# Patient Record
Sex: MALE | Race: BLACK OR AFRICAN AMERICAN | Employment: FULL TIME | URBAN - METROPOLITAN AREA
[De-identification: names, ages, dates, MRNs, and addresses within clinical notes are randomized per-mention and may not be internally consistent; named-entity substitution may affect disease eponyms.]

---

## 2018-07-25 ENCOUNTER — TELEPHONE (OUTPATIENT)
Dept: ONCOLOGY | Age: 38
End: 2018-07-25

## 2018-07-25 NOTE — TELEPHONE ENCOUNTER
Called pt and left a voicemail requesting a return call with information on who referred pt to our office to obtain medical records.

## 2018-07-31 ENCOUNTER — OFFICE VISIT (OUTPATIENT)
Dept: ONCOLOGY | Age: 38
End: 2018-07-31

## 2018-07-31 VITALS
HEART RATE: 84 BPM | BODY MASS INDEX: 19.18 KG/M2 | OXYGEN SATURATION: 100 % | DIASTOLIC BLOOD PRESSURE: 68 MMHG | TEMPERATURE: 96.6 F | RESPIRATION RATE: 18 BRPM | HEIGHT: 71 IN | SYSTOLIC BLOOD PRESSURE: 104 MMHG | WEIGHT: 137 LBS

## 2018-07-31 DIAGNOSIS — K85.20 ALCOHOL-INDUCED ACUTE PANCREATITIS WITHOUT INFECTION OR NECROSIS: ICD-10-CM

## 2018-07-31 DIAGNOSIS — R91.8 LUNG NODULES: ICD-10-CM

## 2018-07-31 DIAGNOSIS — C73 THYROID CANCER (HCC): Primary | ICD-10-CM

## 2018-07-31 RX ORDER — LEVOTHYROXINE SODIUM 100 UG/1
TABLET ORAL
Refills: 1 | COMMUNITY
Start: 2018-05-22 | End: 2019-03-20 | Stop reason: DRUGHIGH

## 2018-07-31 NOTE — PROGRESS NOTES
[facility] [address] [facility] [address] [facility] [place]: [phone]  F: [phone]      Reason for Visit:   Ramírez Lin is a 43 y.o. male who is seen in consultation at the request of Dr. Yareli Casas for evaluation of thyroid cancer. Treatment History:   · CT A/P 4/17/2018: Multiple small round nodules in both lungs suspicious for metastatic disease. Extensive inflammatory changes surrounding pancreas and extending into pelvis consistent with pancreatitis. Hepatomegaly with fatty infiltration of liver. · CT Chest 4/18/2018: Diffuse pulmonary nodularity, worrisome for pulmonary metastases. Mediastinal lymphadenopathy. Asymmetrically enlarged multinodular thyroid goiter with significant enlargement of right thyroid lobe with mild tracheal deviation and narrowing. · Thyroid biopsy 4/18/2018: suspicious for papillary thyroid carcinoma  · Left lung biopsy 4/20/2018: Chronic bronchitis, increased pigmented alveolar macrophages, type II pneumocytes lining alveoli with mild acute and chronic interstitial inflammation  · Total thyroidectomy with central neck dissection and extensive right peritracheal lymph node dissection by Dr. Khanh Basurto 4/23/2018: Papillary carcinoma of the thyroid, usual type, involves both lobes and isthmus, widely multifocal.  Size 3.6cm. Positive anterior and posterior margin. 6/9 nodes involved, largest 1.6cm. · Stage I (pT2m pN1a M0, Age <54yo) Papillary Thyroid Cancer    History of Present Illness:   Ramírez Lin is a pleasant 43 y.o. male who presents today for evaluation of thyroid cancer. He presented to the [facility] ED earlier this year with abdominal pain and was diagnosed with acute pancreatitis. He reports heavy ETOH and dehydration leading up to this. Imaging of his abdomen incidentally noted pulmonary nodules, leading to imaging of his chest which incidentally noted an abnormal thyroid.   He had biopsies of his lung nodules (negative) and thyroid nodule (positive), and ultimately underwent a thyroidectomy while in the hospital.  He has been taking synthroid since discharge, but has had very intermittent compliance, no therapy in several weeks. He feels nauseated after taking these pills. 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 No dyspnea or cough. Pain from pancreatitis has resolved, though some foods still upset his stomach at times. He has cut back on ETOH considerably. He has not yet seen an endocrinologist.    He is unaccompanied today. Past Medical History:   Diagnosis Date    Alcohol-induced acute pancreatitis without infection or necrosis 7/31/2018    Thyroid cancer ([facility] [address] 7/31/2018      No past surgical history on file. Social History   Substance Use Topics    Smoking status: Not on file    Smokeless tobacco: Not on file    Alcohol use Not on file      No family history on file. Current Outpatient Prescriptions   Medication Sig    ibuprofen 100 mg tablet Take 100 mg by mouth every six (6) hours as needed for Pain.  levothyroxine (SYNTHROID) 100 mcg tablet TK 1 T PO D  ON EMPTY STOMACH. THIS REPLACES 0.25MG DOSE     No current facility-administered medications for this visit. No Known Allergies     Review of Systems: A complete review of systems was obtained, negative except as described above.     Physical Exam:     Visit Vitals    /68 (BP 1 Location: Left arm, BP Patient Position: Sitting)    Pulse 84    Temp 96.6 °F (35.9 °C) (Oral)    Resp 18    Ht 5' 10.5\" (1.791 m)    Wt 137 lb (62.1 kg)    SpO2 100%    BMI 19.38 kg/m2     General: No distress  Eyes: PERRLA, anicteric sclerae  HENT: Atraumatic, OP clear  Neck: Supple  Lymphatic: No cervical, supraclavicular, or inguinal adenopathy  Respiratory: CTAB, normal respiratory effort  CV: Normal rate, regular rhythm, no murmurs, no peripheral edema  GI: Soft, nontender, nondistended, no masses, no hepatomegaly, no splenomegaly  MS: Normal gait and station. Digits without clubbing or cyanosis. Skin: No rashes, ecchymoses, or petechiae. Normal temperature, turgor, and texture. Psych: Alert, oriented, appropriate affect, normal judgment/insight    Results:   4/22/2018  WBC: 5.62  GHB: 11.4  PLT: 162  Creatinine: 0.95  Lipase: 2051    6/29/2018  TSH: 3.61    CTA Chest 6/28/2018: Innumerable pulmonary nodules consistent with metastatic disease, similar to April study. Enlarged anterior mediastinal lymph node, similar to prior. Records reviewed and summarized above. Pathology report(s) reviewed above. Radiology report(s) reviewed above. Assessment:   1) Papillary Thyroid Cancer  He is s/p thyroidectomy. He has Stage I disease, based on age <54yo. However, pathology demonstrates positive margins. Additionally, his CT imaging is quite worrisome for pulmonary metastases, despite the negative biopsy, which would make this Stage II. I encouraged him to improve his compliance with synthroid, and explained the justification for the drug, including the importance of thyroid suppression. We also reviewed that thyroid cancer is generally managed by endocrinologists, rather than oncologists, and I recommend referral to meet with an endocrinologist ASAP. They can discuss with him whether BROWN may be indicated. I will remain on standby here to assist if as needed. Plan:     · Referral to endocrinology  · Return to see me as needed    I appreciate the opportunity to participate in Mr. Martha chavez.     Signed By: Ariana Alvarez MD

## 2018-09-14 ENCOUNTER — HOSPITAL ENCOUNTER (OUTPATIENT)
Dept: NUCLEAR MEDICINE | Age: 38
Discharge: HOME | End: 2018-09-14
Attending: INTERNAL MEDICINE
Payer: MEDICAID

## 2018-09-14 DIAGNOSIS — C73 THYROID CANCER (HCC): ICD-10-CM

## 2018-09-14 PROCEDURE — A9517 I131 IODIDE CAP, RX: HCPCS

## 2018-09-21 ENCOUNTER — HOSPITAL ENCOUNTER (OUTPATIENT)
Dept: NUCLEAR MEDICINE | Age: 38
Discharge: HOME | End: 2018-09-21
Attending: INTERNAL MEDICINE
Payer: MEDICAID

## 2018-09-21 DIAGNOSIS — C73 THYROID CANCER (HCC): ICD-10-CM

## 2018-09-21 PROCEDURE — 78018 THYROID MET IMAGING BODY: CPT

## 2019-03-20 ENCOUNTER — OFFICE VISIT (OUTPATIENT)
Dept: FAMILY MEDICINE CLINIC | Age: 39
End: 2019-03-20

## 2019-03-20 VITALS
BODY MASS INDEX: 18.2 KG/M2 | HEIGHT: 71 IN | HEART RATE: 78 BPM | RESPIRATION RATE: 16 BRPM | OXYGEN SATURATION: 99 % | WEIGHT: 130 LBS | TEMPERATURE: 97.8 F | DIASTOLIC BLOOD PRESSURE: 78 MMHG | SYSTOLIC BLOOD PRESSURE: 113 MMHG

## 2019-03-20 DIAGNOSIS — Z00.00 ANNUAL PHYSICAL EXAM: ICD-10-CM

## 2019-03-20 DIAGNOSIS — R91.8 LUNG NODULES: Primary | ICD-10-CM

## 2019-03-20 DIAGNOSIS — C73 THYROID CANCER (HCC): ICD-10-CM

## 2019-03-20 RX ORDER — CHOLECALCIFEROL (VITAMIN D3) 125 MCG
CAPSULE ORAL
COMMUNITY

## 2019-03-20 RX ORDER — LEVOTHYROXINE SODIUM 175 UG/1
175 TABLET ORAL
Qty: 30 TAB | Refills: 5 | Status: SHIPPED | OUTPATIENT
Start: 2019-03-20 | End: 2019-06-24

## 2019-03-20 NOTE — PROGRESS NOTES
Berta Chowdhury is a 43 y.o. male Presenting for his annual checkup and health maintenance review and follow-up Overall, doing well. Thyroid cancer concerns today. Last colonoscopy never. UTD on dental and vision. UTD on influenza. Patient is getting moderate exercise. Trying to eat a well balanced diet. Thyroid cancer ([facility] [address] MANAGED BY DR Lottie Contreras. HAS APPOINTMENT IN April PATIENT SAYS HIS SYNTHROID WAS INCREASED  MCG  MCG AT THE LAST VISIT WITH ENDO, BUT IT WAS GIVING HIM SOME SIDE EFFECTS, SO PT STOPPED TAKING SYNTHROID ALTOGTHER. NOT TAKING SYNTHROID SINCE END OF January. NOW FEELING FATIGUE , WEAKNESS. Health Maintenance Topic  DTaP/Tdap/Td series (1 - Tdap)  Influenza Age 5 to Adult  Pneumococcal 0-64 years Health Maintenance reviewed Vaccinations reviewed There is no immunization history on file for this patient. Past Medical History:  
Diagnosis Date  Acute pancreatitis  Alcohol-induced acute pancreatitis without infection or necrosis 7/31/2018  Lung nodule  Thyroid cancer ([facility] [address] 7/31/2018  
 
 has a past surgical history that includes hx other surgical. 
 
Patient has no known allergies. Current Outpatient Medications Medication Sig  
 naproxen sodium (ALEVE) 220 mg cap Take  by mouth.  levothyroxine (SYNTHROID) 175 mcg tablet Take 1 Tab by mouth Daily (before breakfast).  ibuprofen 100 mg tablet Take 100 mg by mouth every six (6) hours as needed for Pain. No current facility-administered medications for this visit. SOCIAL HX:  reports that he has been smoking. He has been smoking about 0.25 packs per day. He has never used smokeless tobacco. He reports that he drank alcohol. He reports that he does not use drugs. FAMILY HX: family history is not on file. Review of Systems Constitutional: Positive for malaise/fatigue. Night sweats HENT: Negative. Eyes: Negative. Respiratory: Negative. Cardiovascular: Negative. Gastrointestinal: Negative. Genitourinary: Negative. Musculoskeletal: Negative. Skin: Negative. Neurological: Negative. Endo/Heme/Allergies: Negative. Psychiatric/Behavioral: Negative. Physical exam 
Blood pressure 113/78, pulse 78, temperature 97.8 °F (36.6 °C), temperature source Oral, resp. rate 16, height 5' 10.5\" (1.791 m), weight 130 lb (59 kg), SpO2 99 %. Wt Readings from Last 3 Encounters:  
03/20/19 130 lb (59 kg) 07/31/18 137 lb (62.1 kg) Gen:  Well developed, well nourished male in no acute distress HEENT: normocephalic/atraumatic; PERRL; TM intact, translucent, and neutral BL;  oropharynx shows no erythema or exudates Skin:  No rashes or suspicious skin lesions noted Neck:   Supple, no lympadenopathy, no thyromegaly Card:  RRR, no m/r/g Chest:  CTAB, no w/r/r Abd:  BS+, Soft, nontender/nondistended Extr:  2+ pulses BL, no LE edema MS:   full ROM, 5/5 strength BL, sensation intact Neuro: AAO X 3, CN II-XII grossly intact Psych:  Nl mood and affect 1. Lung nodules MULTIPLE LUNG NODULES NOTED ON CT SCAN, FIRST LUNG BIOPSY NEGATIVE. SECOND LUNG BIOPSY SUGGESTED BY ENDOCRINOLOGIST. NEEDS TO FOLLOW UP WITH ENDO FOR THAT . - CBC WITH AUTOMATED DIFF 
- METABOLIC PANEL, COMPREHENSIVE 
- VITAMIN D, 25 HYDROXY 2. Thyroid cancer ([facility] [address] MANAGED BY DR Lottie Riley. HAS APPOINTMENT IN April PATIENT SAYS HIS SYNTHROID WAS INCREASED FROM 125 MCG  MCG AT THE LAST VISIT WITH ENDO, BUT IT WAS GIVING HIM SOME SIDE EFFECTS, SO PT STOPPED TAKING SYNTHROID ALTOGETHER. NOT TAKING SYNTHROID SINCE END OF January. ADVISED PATIENT TO RESTART SYNTHROID  MCG. FAXED A NEW RX TO PHARMACY. ADVISED PT TO FOLLOW UP WITH ENDO. - CBC WITH AUTOMATED DIFF 
- METABOLIC PANEL, COMPREHENSIVE 
- TSH 3RD GENERATION 
- VITAMIN D, 25 HYDROXY 
- levothyroxine (SYNTHROID) 175 mcg tablet;  Take 1 Tab by mouth Daily (before breakfast). Dispense: 30 Tab; Refill: 5 
 
3. Annual physical exam 
 
- CBC WITH AUTOMATED DIFF 
- LIPID PANEL 
- METABOLIC PANEL, COMPREHENSIVE 
- URINALYSIS W/ RFLX MICROSCOPIC 
- VITAMIN D, 25 HYDROXY 
 
 
 
 
 
43 y.o. male who presents today for annual exam. UTD on screening. utd on vaccines. Will check routine labs. Encouraged daily exercise and trying to eat a well balanced diet. I have discussed the diagnosis with the patient and the intended plan as seen in the above orders. The patient has received an after-visit summary and questions were answered concerning future plans. I have discussed medication side effects and warnings with the patient as well. The patient agrees and understands above plan. Follow-up Disposition: Not on File Simin Mcneill MD

## 2019-03-20 NOTE — PROGRESS NOTES
Identified pt with two pt identifiers(name and ). Chief Complaint Patient presents with  Complete Physical  
  
 
Health Maintenance Due Topic  DTaP/Tdap/Td series (1 - Tdap)  Influenza Age 5 to Adult Wt Readings from Last 3 Encounters:  
19 130 lb (59 kg) 18 137 lb (62.1 kg) Temp Readings from Last 3 Encounters:  
18 96.6 °F (35.9 °C) (Oral) BP Readings from Last 3 Encounters:  
18 104/68 Pulse Readings from Last 3 Encounters:  
18 84 Learning Assessment: 
:  
 
No flowsheet data found. Depression Screening: 
:  
 
3 most recent PHQ Screens 3/20/2019 Little interest or pleasure in doing things Not at all Feeling down, depressed, irritable, or hopeless Not at all Total Score PHQ 2 0 Fall Risk Assessment: 
:  
 
No flowsheet data found. Abuse Screening: 
:  
 
No flowsheet data found. Coordination of Care Questionnaire: 
:  
 
1) Have you been to an emergency room, urgent care clinic since your last visit? no  
Hospitalized since your last visit? no          
 
2) Have you seen or consulted any other health care providers outside of [address] [place] [address] since your last visit? Dr. Tino Brunner   (Include any pap smears or colon screenings in this section.) 3) Do you have an Advance Directive on file? no 
Are you interested in receiving information about Advance Directives? no 
 
Patient is accompanied by self I have received verbal consent from Berta Chowdhury to discuss any/all medical information while they are present in the room. Reviewed record in preparation for visit and have obtained necessary documentation. Medication reconciliation up to date and corrected with patient at this time.

## 2019-03-21 LAB
25(OH)D3+25(OH)D2 SERPL-MCNC: 15.8 NG/ML (ref 30–100)
ALBUMIN SERPL-MCNC: 4.1 G/DL (ref 3.5–5.5)
ALBUMIN/GLOB SERPL: 1.4 {RATIO} (ref 1.2–2.2)
ALP SERPL-CCNC: 74 IU/L (ref 39–117)
ALT SERPL-CCNC: 13 IU/L (ref 0–44)
APPEARANCE UR: CLEAR
AST SERPL-CCNC: 28 IU/L (ref 0–40)
BASOPHILS # BLD AUTO: 0.1 X10E3/UL (ref 0–0.2)
BASOPHILS NFR BLD AUTO: 1 %
BILIRUB SERPL-MCNC: 1.2 MG/DL (ref 0–1.2)
BILIRUB UR QL STRIP: NEGATIVE
BUN SERPL-MCNC: 11 MG/DL (ref 6–20)
BUN/CREAT SERPL: 11 (ref 9–20)
CALCIUM SERPL-MCNC: 8.7 MG/DL (ref 8.7–10.2)
CHLORIDE SERPL-SCNC: 101 MMOL/L (ref 96–106)
CHOLEST SERPL-MCNC: 102 MG/DL (ref 100–199)
CO2 SERPL-SCNC: 25 MMOL/L (ref 20–29)
COLOR UR: YELLOW
CREAT SERPL-MCNC: 1.02 MG/DL (ref 0.76–1.27)
EOSINOPHIL # BLD AUTO: 0.3 X10E3/UL (ref 0–0.4)
EOSINOPHIL NFR BLD AUTO: 5 %
ERYTHROCYTE [DISTWIDTH] IN BLOOD BY AUTOMATED COUNT: 13.9 % (ref 12.3–15.4)
GLOBULIN SER CALC-MCNC: 2.9 G/DL (ref 1.5–4.5)
GLUCOSE SERPL-MCNC: 82 MG/DL (ref 65–99)
GLUCOSE UR QL: NEGATIVE
HCT VFR BLD AUTO: 43.1 % (ref 37.5–51)
HDLC SERPL-MCNC: 58 MG/DL
HGB BLD-MCNC: 14.6 G/DL (ref 13–17.7)
HGB UR QL STRIP: NEGATIVE
IMM GRANULOCYTES # BLD AUTO: 0 X10E3/UL (ref 0–0.1)
IMM GRANULOCYTES NFR BLD AUTO: 0 %
KETONES UR QL STRIP: NEGATIVE
LDLC SERPL CALC-MCNC: 28 MG/DL (ref 0–99)
LEUKOCYTE ESTERASE UR QL STRIP: NEGATIVE
LYMPHOCYTES # BLD AUTO: 1.3 X10E3/UL (ref 0.7–3.1)
LYMPHOCYTES NFR BLD AUTO: 24 %
MCH RBC QN AUTO: 33 PG (ref 26.6–33)
MCHC RBC AUTO-ENTMCNC: 33.9 G/DL (ref 31.5–35.7)
MCV RBC AUTO: 98 FL (ref 79–97)
MICRO URNS: NORMAL
MONOCYTES # BLD AUTO: 0.3 X10E3/UL (ref 0.1–0.9)
MONOCYTES NFR BLD AUTO: 5 %
NEUTROPHILS # BLD AUTO: 3.4 X10E3/UL (ref 1.4–7)
NEUTROPHILS NFR BLD AUTO: 65 %
NITRITE UR QL STRIP: NEGATIVE
PH UR STRIP: 5 [PH] (ref 5–7.5)
PLATELET # BLD AUTO: 136 X10E3/UL (ref 150–379)
POTASSIUM SERPL-SCNC: 4 MMOL/L (ref 3.5–5.2)
PROT SERPL-MCNC: 7 G/DL (ref 6–8.5)
PROT UR QL STRIP: NEGATIVE
RBC # BLD AUTO: 4.42 X10E6/UL (ref 4.14–5.8)
SODIUM SERPL-SCNC: 141 MMOL/L (ref 134–144)
SP GR UR: 1.02 (ref 1–1.03)
TRIGL SERPL-MCNC: 81 MG/DL (ref 0–149)
TSH SERPL DL<=0.005 MIU/L-ACNC: 18.73 UIU/ML (ref 0.45–4.5)
UROBILINOGEN UR STRIP-MCNC: 0.2 MG/DL (ref 0.2–1)
VLDLC SERPL CALC-MCNC: 16 MG/DL (ref 5–40)
WBC # BLD AUTO: 5.3 X10E3/UL (ref 3.4–10.8)

## 2019-03-22 NOTE — PROGRESS NOTES
PLEASE CALL PATIENT & INFORM HIM THAT HIS TSH IS TOO HIGH, IT IS VERY IMPORTANT THAT HE STARTS HIS THYROID MEDICATION, & TAKES IT AS PRESCRIBED. HE NEEDS TO FOLLOWUP WITH HIS ENDOCRINOLOGIST SOON. THANKS.

## 2019-03-25 NOTE — PROGRESS NOTES
Left message for patient, advised TSH is high, needs to take thyroid medication as prescribed and needs to follow up with his endocrinologist as soon as possible. Advised him to call back if he would like lab letter mailed.

## 2019-05-29 ENCOUNTER — OFFICE VISIT (OUTPATIENT)
Dept: FAMILY MEDICINE CLINIC | Age: 39
End: 2019-05-29

## 2019-05-29 VITALS
TEMPERATURE: 98.1 F | OXYGEN SATURATION: 95 % | HEIGHT: 71 IN | WEIGHT: 122 LBS | RESPIRATION RATE: 16 BRPM | DIASTOLIC BLOOD PRESSURE: 68 MMHG | HEART RATE: 104 BPM | BODY MASS INDEX: 17.08 KG/M2 | SYSTOLIC BLOOD PRESSURE: 95 MMHG

## 2019-05-29 DIAGNOSIS — R10.31 RIGHT LOWER QUADRANT ABDOMINAL PAIN: Primary | ICD-10-CM

## 2019-05-29 RX ORDER — LEVOTHYROXINE SODIUM 200 UG/1
200 TABLET ORAL DAILY
COMMUNITY
Start: 2019-05-23

## 2019-05-29 NOTE — PROGRESS NOTES
Identified pt with two pt identifiers(name and ). Reviewed record in preparation for visit and have obtained necessary documentation. Chief Complaint   Patient presents with    Constipation     last bowel movement 2 days ago, thin hard stools; has been passing gas    Epigastric Pain     some vomiting with burning        Health Maintenance Due   Topic    Pneumococcal 0-64 years (1 of 1 - PPSV23)    DTaP/Tdap/Td series (1 - Tdap)       Coordination of Care Questionnaire:  :   1) Have you been to an emergency room, urgent care, or hospitalized since your last visit? If yes, where when, and reason for visit? no       2. Have seen or consulted any other health care provider since your last visit? If yes, where when, and reason for visit? YES  - Dr. Nel Petty ([facility])      Patient is accompanied by girlfriend I have received verbal consent from Deisi Campuzano to discuss any/all medical information while they are present in the room.

## 2019-05-29 NOTE — PROGRESS NOTES
Krystin Purvis is a 35 y.o. male who presents today with the following:    HPI  Chief Complaint   Patient presents with    Constipation     last bowel movement 2 days ago, thin hard stools; has been passing gas    Epigastric Pain     some vomiting with burning    Incisional Pain     internal stiches from abdominal surgery (in 2007) have started to protrude, pt reports foul smell when he pulled one out   Abdominal pain: Patient is here with abdominal pain. He says his symptoms started 2 weeks ago. Complaining of constipation which is intermittent, and passes hard stools. Also complaining of epigastric pain. He vomited once today at 4 AM.  He does not have any fevers or chills but his pulse is elevated and is in discomfort. Patient reports abdominal pain to be a level 8/10 at this time. Associated symptoms include chest tightness, heartburn, bubbling and bloating feeling in the abdomen. Patient has tried Tums which did not help him. Patient is supposed to start radiation therapy this week by his endocrinologist for thyroid cancer. Review of Systems   Constitutional: Negative. HENT: Negative. Eyes: Negative. Respiratory: Negative. Cardiovascular: Negative. Gastrointestinal: Positive for abdominal pain, constipation, heartburn and vomiting. Genitourinary: Negative. Musculoskeletal: Negative. Skin: Negative. Neurological: Negative. Endo/Heme/Allergies: Negative. Psychiatric/Behavioral: Negative. Physical Exam   Constitutional: He is oriented to person, place, and time and well-developed, well-nourished, and in no distress. HENT:   Head: Normocephalic and atraumatic. Right Ear: External ear normal.   Left Ear: External ear normal.   Nose: Nose normal.   Mouth/Throat: No oropharyngeal exudate. Eyes: Conjunctivae are normal.   Neck: Normal range of motion. Neck supple. No thyromegaly present. Cardiovascular: Regular rhythm. Tachycardia present.    Pulmonary/Chest: Effort normal and breath sounds normal.   Abdominal: Bowel sounds are normal. He exhibits no distension. There is tenderness in the right upper quadrant and right lower quadrant. There is guarding. Musculoskeletal: Normal range of motion. He exhibits no edema. Lymphadenopathy:     He has no cervical adenopathy. Neurological: He is alert and oriented to person, place, and time. Skin: Skin is warm and dry. Psychiatric: Mood and affect normal.   Nursing note and vitals reviewed. BP 95/68   Pulse (!) 104   Temp 98.1 °F (36.7 °C) (Oral)   Resp 16   Ht 5' 10.5\" (1.791 m)   Wt 122 lb (55.3 kg)   SpO2 95%   BMI 17.26 kg/m²     No Known Allergies    Current Outpatient Medications   Medication Sig    levothyroxine (SYNTHROID) 200 mcg tablet Take 200 mcg by mouth daily.  naproxen sodium (ALEVE) 220 mg cap Take  by mouth.  ibuprofen 100 mg tablet Take 100 mg by mouth every six (6) hours as needed for Pain.  levothyroxine (SYNTHROID) 175 mcg tablet Take 1 Tab by mouth Daily (before breakfast). (Patient not taking: Reported on 5/29/2019)     No current facility-administered medications for this visit. Past Medical History:   Diagnosis Date    Acute pancreatitis     Alcohol-induced acute pancreatitis without infection or necrosis 7/31/2018    Lung nodule     Thyroid cancer ([facility] [address] 7/31/2018       Past Surgical History:   Procedure Laterality Date    HX OTHER SURGICAL      left chest trauma - 2004       No results found for this visit on 05/29/19.      1. Right lower quadrant abdominal pain  Patient having right lower quadrant and right upper quadrant and epigastric tenderness. With elevated heart rate and tenderness on palpation I would send patient to ER to rule out appendicitis, pancreatitis or any other intra-abdominal pathology.  - REFERRAL TO EMERGENCY DEPARTMENT        Follow-up and Dispositions    · Return if symptoms worsen or fail to improve.          I have discussed the diagnosis with the patient and the intended plan as seen in the above orders. The patient has received an after-visit summary and questions were answered concerning future plans. I have discussed medication side effects and warnings with the patient as well. The patient agrees and understands above plan.            Betzy Loomis MD

## 2019-06-24 ENCOUNTER — OFFICE VISIT (OUTPATIENT)
Dept: FAMILY MEDICINE CLINIC | Age: 39
End: 2019-06-24

## 2019-06-24 VITALS
SYSTOLIC BLOOD PRESSURE: 109 MMHG | RESPIRATION RATE: 16 BRPM | WEIGHT: 124 LBS | HEIGHT: 71 IN | BODY MASS INDEX: 17.36 KG/M2 | TEMPERATURE: 98.4 F | HEART RATE: 92 BPM | OXYGEN SATURATION: 98 % | DIASTOLIC BLOOD PRESSURE: 74 MMHG

## 2019-06-24 DIAGNOSIS — K56.51 INTESTINAL ADHESIONS WITH PARTIAL OBSTRUCTION (HCC): Primary | ICD-10-CM

## 2019-06-24 RX ORDER — ONDANSETRON 4 MG/1
TABLET, FILM COATED ORAL
COMMUNITY
Start: 2019-06-18

## 2019-06-24 NOTE — PROGRESS NOTES
Chetna Pulido is a 35 y.o. male      Chief Complaint   Patient presents with    Follow-up     ER visit          1. Have you been to the ER, urgent care clinic since your last visit? Hospitalized since your last visit? Yes ER abscess small intestine      2. Have you seen or consulted any other health care providers outside of the [address] [place] [address] since your last visit? Include any pap smears or colon screening.   no

## 2019-06-24 NOTE — PROGRESS NOTES
Deisi Campuzano is a 35 y.o. male who presents today with the following:    HPI  Chief Complaint   Patient presents with    Follow-up     ER visit      Patient was seen in the office on 5/29/2019 for abdominal pain and was sent to the ER. Patient went to Dr. Staci Freeman right away and was diagnosed with abscess and small bowel duodenum. The abscess was drained and he also had scar tissue causing obstruction of the small bowel. He got surgery done by Dr. Baron Pena and now feels better. He was discharged on Justina 10. His abdominal pain has improved and he does not take pain medication anymore. He has follow-up appointment with surgeon tomorrow. Review of Systems   Constitutional: Negative. HENT: Negative. Eyes: Negative. Respiratory: Negative. Cardiovascular: Negative. Gastrointestinal: Positive for abdominal pain. Genitourinary: Negative. Musculoskeletal: Negative. Skin: Negative. Neurological: Negative. Endo/Heme/Allergies: Negative. Psychiatric/Behavioral: Negative. Physical Exam   Constitutional: He is oriented to person, place, and time and well-developed, well-nourished, and in no distress. HENT:   Head: Normocephalic and atraumatic. Right Ear: External ear normal.   Left Ear: External ear normal.   Nose: Nose normal.   Mouth/Throat: No oropharyngeal exudate. Eyes: Conjunctivae are normal.   Neck: Normal range of motion. Neck supple. No thyromegaly present. Cardiovascular: Normal rate and regular rhythm. Pulmonary/Chest: Effort normal and breath sounds normal.   Abdominal: Soft. Bowel sounds are normal. He exhibits no distension. There is no tenderness. Musculoskeletal: Normal range of motion. He exhibits no edema. Lymphadenopathy:     He has no cervical adenopathy. Neurological: He is alert and oriented to person, place, and time. Skin: Skin is warm and dry. Psychiatric: Mood and affect normal.   Nursing note and vitals reviewed.     /74 (BP 1 Location: Right arm, BP Patient Position: Sitting)   Pulse 92   Temp 98.4 °F (36.9 °C) (Oral)   Resp 16   Ht 5' 10.5\" (1.791 m)   Wt 124 lb (56.2 kg)   SpO2 98%   BMI 17.54 kg/m²     No Known Allergies    Current Outpatient Medications   Medication Sig    ondansetron hcl (ZOFRAN) 4 mg tablet     levothyroxine (SYNTHROID) 200 mcg tablet Take 200 mcg by mouth daily.  naproxen sodium (ALEVE) 220 mg cap Take  by mouth.  ibuprofen 100 mg tablet Take 100 mg by mouth every six (6) hours as needed for Pain. No current facility-administered medications for this visit. Past Medical History:   Diagnosis Date    Acute pancreatitis     Alcohol-induced acute pancreatitis without infection or necrosis 7/31/2018    Lung nodule     Thyroid cancer ([facility] [address] 7/31/2018       Past Surgical History:   Procedure Laterality Date    HX OTHER SURGICAL      left chest trauma - 2004       No results found for this visit on 06/24/19.      1. Intestinal adhesions with partial obstruction [facility])  We will get records from [facility].  Patient has follow-up appointment with Dr. Derek Jones tomorrow. Patient feels better and does not need any pain medication today. Patient is being followed by endocrinologist for his thyroid cancer. He will follow-up with Endo after July 4 to set up radiation therapy. His thyroid medication was recently increased after blood work done on 5/20/2019. Follow-up and Dispositions    · Return in about 3 months (around 9/24/2019) for follow up. I have discussed the diagnosis with the patient and the intended plan as seen in the above orders. The patient has received an after-visit summary and questions were answered concerning future plans. I have discussed medication side effects and warnings with the patient as well. The patient agrees and understands above plan.            Ling Millard MD

## 2019-09-06 ENCOUNTER — HOSPITAL ENCOUNTER (OUTPATIENT)
Dept: NUCLEAR MEDICINE | Age: 39
Discharge: HOME | End: 2019-09-06
Attending: INTERNAL MEDICINE
Payer: MEDICAID

## 2019-09-06 DIAGNOSIS — C73 THYROID CANCER (HCC): ICD-10-CM

## 2019-09-06 PROCEDURE — 79005 NUCLEAR RX ORAL ADMIN: CPT

## 2019-09-13 ENCOUNTER — HOSPITAL ENCOUNTER (OUTPATIENT)
Dept: NUCLEAR MEDICINE | Age: 39
Discharge: HOME | End: 2019-09-13
Attending: INTERNAL MEDICINE
Payer: MEDICAID

## 2019-09-13 DIAGNOSIS — C73 THYROID CANCER (HCC): ICD-10-CM

## 2019-09-13 PROCEDURE — 78018 THYROID MET IMAGING BODY: CPT

## 2021-02-06 NOTE — LETTER
NOTIFICATION RETURN TO WORK / SCHOOL 
 
6/24/2019 12:26 PM 
 
Mr. Tabatha Chandler [address] [facility] [id number] To Whom It May Concern: 
 
Tabatha Snow is currently under the care of [address] P[facility]rez. He started having medical problems on 5/21/19. He will return to work/school on: 7/15/19. If there are questions or concerns please have the patient contact our office. Sincerely, Ethan Brewer MD 
 
                                
 

No

## 2022-03-19 PROBLEM — R91.8 LUNG NODULES: Status: ACTIVE | Noted: 2018-07-31

## 2022-03-19 PROBLEM — C73 THYROID CANCER (HCC): Status: ACTIVE | Noted: 2018-07-31

## 2022-03-20 PROBLEM — K85.20 ALCOHOL-INDUCED ACUTE PANCREATITIS WITHOUT INFECTION OR NECROSIS: Status: ACTIVE | Noted: 2018-07-31

## 2023-05-25 RX ORDER — LEVOTHYROXINE SODIUM 0.2 MG/1
200 TABLET ORAL DAILY
COMMUNITY
Start: 2019-05-23

## 2023-05-25 RX ORDER — ONDANSETRON 4 MG/1
TABLET, FILM COATED ORAL
COMMUNITY
Start: 2019-06-18

## 2023-05-25 RX ORDER — COVID-19 ANTIGEN TEST
KIT MISCELLANEOUS
COMMUNITY

## 2024-08-28 ENCOUNTER — OFFICE VISIT (OUTPATIENT)
Age: 44
End: 2024-08-28
Payer: COMMERCIAL

## 2024-08-28 VITALS
BODY MASS INDEX: 13.97 KG/M2 | WEIGHT: 99.8 LBS | DIASTOLIC BLOOD PRESSURE: 78 MMHG | OXYGEN SATURATION: 95 % | SYSTOLIC BLOOD PRESSURE: 107 MMHG | RESPIRATION RATE: 14 BRPM | HEART RATE: 96 BPM | HEIGHT: 71 IN

## 2024-08-28 DIAGNOSIS — R63.4 UNINTENTIONAL WEIGHT LOSS: ICD-10-CM

## 2024-08-28 DIAGNOSIS — M25.811 IMPINGEMENT OF RIGHT SHOULDER: ICD-10-CM

## 2024-08-28 DIAGNOSIS — M54.50 ACUTE BILATERAL LOW BACK PAIN WITHOUT SCIATICA: Primary | ICD-10-CM

## 2024-08-28 PROBLEM — R79.83 HYPERHOMOCYSTINEMIA: Status: ACTIVE | Noted: 2024-08-28

## 2024-08-28 PROBLEM — Z87.19 HISTORY OF PANCREATITIS: Status: ACTIVE | Noted: 2018-07-31

## 2024-08-28 PROBLEM — K52.9 CHRONIC DIARRHEA: Status: ACTIVE | Noted: 2024-08-28

## 2024-08-28 PROBLEM — E03.9 ACQUIRED HYPOTHYROIDISM: Status: ACTIVE | Noted: 2024-08-28

## 2024-08-28 PROBLEM — Z85.850 HISTORY OF THYROID CANCER: Status: ACTIVE | Noted: 2018-07-31

## 2024-08-28 PROBLEM — D50.9 IRON DEFICIENCY ANEMIA: Status: ACTIVE | Noted: 2024-08-28

## 2024-08-28 PROCEDURE — 99203 OFFICE O/P NEW LOW 30 MIN: CPT | Performed by: INTERNAL MEDICINE

## 2024-08-28 RX ORDER — ERGOCALCIFEROL 1.25 MG/1
1.25 CAPSULE ORAL WEEKLY
COMMUNITY
Start: 2024-05-17 | End: 2025-05-17

## 2024-08-28 RX ORDER — FERROUS SULFATE 324(65)MG
324 TABLET, DELAYED RELEASE (ENTERIC COATED) ORAL
COMMUNITY
Start: 2023-01-15

## 2024-08-28 RX ORDER — FAMOTIDINE 20 MG/1
20 TABLET, FILM COATED ORAL
COMMUNITY
Start: 2023-05-02

## 2024-08-28 RX ORDER — LEVOTHYROXINE SODIUM 137 UG/1
137 TABLET ORAL DAILY
COMMUNITY

## 2024-08-28 RX ORDER — CYCLOBENZAPRINE HCL 10 MG
10 TABLET ORAL 3 TIMES DAILY PRN
Qty: 30 TABLET | Refills: 1 | Status: SHIPPED | OUTPATIENT
Start: 2024-08-28

## 2024-08-28 RX ORDER — CYCLOBENZAPRINE HCL 10 MG
10 TABLET ORAL 3 TIMES DAILY PRN
COMMUNITY
Start: 2024-07-18 | End: 2024-08-28 | Stop reason: SDUPTHER

## 2024-08-28 ASSESSMENT — PATIENT HEALTH QUESTIONNAIRE - PHQ9
SUM OF ALL RESPONSES TO PHQ QUESTIONS 1-9: 0
2. FEELING DOWN, DEPRESSED OR HOPELESS: NOT AT ALL
SUM OF ALL RESPONSES TO PHQ9 QUESTIONS 1 & 2: 0
SUM OF ALL RESPONSES TO PHQ QUESTIONS 1-9: 0
1. LITTLE INTEREST OR PLEASURE IN DOING THINGS: NOT AT ALL
SUM OF ALL RESPONSES TO PHQ QUESTIONS 1-9: 0
SUM OF ALL RESPONSES TO PHQ QUESTIONS 1-9: 0
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
## 2024-09-18 RX ORDER — LEVOTHYROXINE SODIUM 137 UG/1
137 TABLET ORAL DAILY
Qty: 30 TABLET | Refills: 1 | OUTPATIENT
Start: 2024-09-18 | End: 2024-11-17

## 2024-09-24 DIAGNOSIS — M54.50 ACUTE BILATERAL LOW BACK PAIN WITHOUT SCIATICA: ICD-10-CM

## 2024-09-25 RX ORDER — CYCLOBENZAPRINE HCL 10 MG
10 TABLET ORAL 3 TIMES DAILY PRN
Qty: 30 TABLET | Refills: 0 | Status: SHIPPED | OUTPATIENT
Start: 2024-09-25

## 2024-11-18 DIAGNOSIS — M54.50 ACUTE BILATERAL LOW BACK PAIN WITHOUT SCIATICA: ICD-10-CM

## 2024-11-18 RX ORDER — CYCLOBENZAPRINE HCL 10 MG
10 TABLET ORAL 3 TIMES DAILY PRN
Qty: 30 TABLET | Refills: 0 | OUTPATIENT
Start: 2024-11-18

## 2024-11-18 NOTE — TELEPHONE ENCOUNTER
PCP: Anjali Rebolledo MD    Last appt:  8/28/24  No future appointments.    Requested Prescriptions     Pending Prescriptions Disp Refills    cyclobenzaprine (FLEXERIL) 10 MG tablet 30 tablet 0     Sig: Take 1 tablet by mouth 3 times daily as needed for Muscle spasms     Last ordered 9/25/24    Patient requesting refill and states that back pain is no better and possibly worse.     Carisa \"Balaji\" JUJU Nielsen

## 2024-12-11 ENCOUNTER — OFFICE VISIT (OUTPATIENT)
Facility: CLINIC | Age: 44
End: 2024-12-11
Payer: COMMERCIAL

## 2024-12-11 VITALS
HEART RATE: 80 BPM | SYSTOLIC BLOOD PRESSURE: 105 MMHG | BODY MASS INDEX: 13.22 KG/M2 | RESPIRATION RATE: 14 BRPM | OXYGEN SATURATION: 97 % | HEIGHT: 71 IN | WEIGHT: 94.4 LBS | DIASTOLIC BLOOD PRESSURE: 76 MMHG

## 2024-12-11 DIAGNOSIS — M25.811 IMPINGEMENT OF RIGHT SHOULDER: Primary | ICD-10-CM

## 2024-12-11 DIAGNOSIS — M54.9 UPPER BACK PAIN ON RIGHT SIDE: ICD-10-CM

## 2024-12-11 DIAGNOSIS — R63.4 UNINTENTIONAL WEIGHT LOSS: ICD-10-CM

## 2024-12-11 PROCEDURE — 99214 OFFICE O/P EST MOD 30 MIN: CPT | Performed by: INTERNAL MEDICINE

## 2024-12-11 RX ORDER — CYCLOBENZAPRINE HCL 10 MG
10 TABLET ORAL 3 TIMES DAILY PRN
Qty: 15 TABLET | Refills: 2 | Status: SHIPPED | OUTPATIENT
Start: 2024-12-11

## 2024-12-11 ASSESSMENT — PATIENT HEALTH QUESTIONNAIRE - PHQ9
SUM OF ALL RESPONSES TO PHQ QUESTIONS 1-9: 2
SUM OF ALL RESPONSES TO PHQ QUESTIONS 1-9: 2
1. LITTLE INTEREST OR PLEASURE IN DOING THINGS: SEVERAL DAYS
2. FEELING DOWN, DEPRESSED OR HOPELESS: SEVERAL DAYS
SUM OF ALL RESPONSES TO PHQ9 QUESTIONS 1 & 2: 2
SUM OF ALL RESPONSES TO PHQ QUESTIONS 1-9: 2
SUM OF ALL RESPONSES TO PHQ QUESTIONS 1-9: 2

## 2024-12-11 NOTE — PATIENT INSTRUCTIONS
GI--call for an appointment.     You should ask Dr. Montiel's office for an appointment at the [facility]
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
## 2025-01-07 RX ORDER — CYCLOBENZAPRINE HCL 10 MG
10 TABLET ORAL 3 TIMES DAILY PRN
Qty: 15 TABLET | Refills: 2 | Status: SHIPPED | OUTPATIENT
Start: 2025-01-07

## 2025-01-07 NOTE — TELEPHONE ENCOUNTER
PCP: Anjali Rebolledo MD    Last Appointment: 12/11/2024    Future Appointments   Date Time Provider Department Center   1/8/2025  1:00 PM Neha Barrios MD [facility]   1/13/2025  4:00 PM Ariel Mari, PT [facility]   2/20/2025  2:00 PM Fidencio Garcia MD [facility]       Requested Prescriptions     Pending Prescriptions Disp Refills    cyclobenzaprine (FLEXERIL) 10 MG tablet 15 tablet 2     Sig: Take 1 tablet by mouth 3 times daily as needed for Muscle spasms       LAST ORDERED: 12/11/24   Pt has appt tomorrow for Ortho Surgery/NP Shoulder Xray needed  Appt 1/13/25-[facility] PT Eval  Appt 2/20/25-NP Spine PT-Lower back pain    Carisa \"Sledge\" JUJU Nielsen

## 2025-01-08 ENCOUNTER — OFFICE VISIT (OUTPATIENT)
Age: 45
End: 2025-01-08
Payer: MEDICAID

## 2025-01-08 VITALS
WEIGHT: 99.65 LBS | SYSTOLIC BLOOD PRESSURE: 102 MMHG | HEIGHT: 71 IN | HEART RATE: 83 BPM | OXYGEN SATURATION: 99 % | DIASTOLIC BLOOD PRESSURE: 71 MMHG | BODY MASS INDEX: 13.95 KG/M2 | TEMPERATURE: 97.9 F | RESPIRATION RATE: 15 BRPM

## 2025-01-08 DIAGNOSIS — M75.21 BICEPS TENDINITIS OF RIGHT UPPER EXTREMITY: ICD-10-CM

## 2025-01-08 DIAGNOSIS — M75.81 ROTATOR CUFF TENDINITIS, RIGHT: ICD-10-CM

## 2025-01-08 DIAGNOSIS — M25.511 RIGHT SHOULDER PAIN, UNSPECIFIED CHRONICITY: Primary | ICD-10-CM

## 2025-01-08 PROCEDURE — 99203 OFFICE O/P NEW LOW 30 MIN: CPT | Performed by: ORTHOPAEDIC SURGERY

## 2025-01-08 ASSESSMENT — PATIENT HEALTH QUESTIONNAIRE - PHQ9
SUM OF ALL RESPONSES TO PHQ9 QUESTIONS 1 & 2: 0
SUM OF ALL RESPONSES TO PHQ QUESTIONS 1-9: 0
1. LITTLE INTEREST OR PLEASURE IN DOING THINGS: NOT AT ALL
SUM OF ALL RESPONSES TO PHQ QUESTIONS 1-9: 0
2. FEELING DOWN, DEPRESSED OR HOPELESS: NOT AT ALL
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
## 2025-01-08 NOTE — PROGRESS NOTES
Identified pt with two pt identifiers (name and ). Reviewed chart in preparation for visit and have obtained necessary documentation.    Thanh Gonzales is a 44 y.o. male  Chief Complaint   Patient presents with    New Patient     Right shoulder pain      /71 (Site: Left Upper Arm, Position: Sitting, Cuff Size: Small Adult)   Pulse 83   Temp 97.9 °F (36.6 °C) (Oral)   Resp 15   Ht 1.803 m (5' 11\")   Wt 45.2 kg (99 lb 10.4 oz)   SpO2 99%   BMI 13.90 kg/m²